# Patient Record
Sex: FEMALE | Race: BLACK OR AFRICAN AMERICAN | Employment: PART TIME | URBAN - METROPOLITAN AREA
[De-identification: names, ages, dates, MRNs, and addresses within clinical notes are randomized per-mention and may not be internally consistent; named-entity substitution may affect disease eponyms.]

---

## 2017-08-06 ENCOUNTER — HOSPITAL ENCOUNTER (EMERGENCY)
Age: 52
Discharge: HOME | End: 2017-08-06
Attending: EMERGENCY MEDICINE
Payer: MEDICAID

## 2017-08-06 VITALS
HEIGHT: 65 IN | TEMPERATURE: 97.8 F | RESPIRATION RATE: 13 BRPM | OXYGEN SATURATION: 100 % | DIASTOLIC BLOOD PRESSURE: 93 MMHG | WEIGHT: 218 LBS | HEART RATE: 59 BPM | SYSTOLIC BLOOD PRESSURE: 181 MMHG | BODY MASS INDEX: 36.32 KG/M2

## 2017-08-06 DIAGNOSIS — R03.0 ELEVATED BLOOD PRESSURE READING: ICD-10-CM

## 2017-08-06 DIAGNOSIS — R60.9 PERIPHERAL EDEMA: Primary | ICD-10-CM

## 2017-08-06 LAB
ANION GAP BLD CALC-SCNC: 5 MMOL/L (ref 3–18)
ATRIAL RATE: 56 BPM
BASOPHILS # BLD AUTO: 0 K/UL (ref 0–0.06)
BASOPHILS # BLD: 0 % (ref 0–2)
BNP SERPL-MCNC: 14 PG/ML (ref 0–450)
BUN SERPL-MCNC: 11 MG/DL (ref 7–18)
BUN/CREAT SERPL: 16 (ref 12–20)
CALCIUM SERPL-MCNC: 9.2 MG/DL (ref 8.5–10.1)
CALCULATED P AXIS, ECG09: 34 DEGREES
CALCULATED R AXIS, ECG10: -32 DEGREES
CALCULATED T AXIS, ECG11: -9 DEGREES
CHLORIDE SERPL-SCNC: 103 MMOL/L (ref 100–108)
CO2 SERPL-SCNC: 33 MMOL/L (ref 21–32)
CREAT SERPL-MCNC: 0.67 MG/DL (ref 0.6–1.3)
DIAGNOSIS, 93000: NORMAL
DIFFERENTIAL METHOD BLD: NORMAL
EOSINOPHIL # BLD: 0.2 K/UL (ref 0–0.4)
EOSINOPHIL NFR BLD: 5 % (ref 0–5)
ERYTHROCYTE [DISTWIDTH] IN BLOOD BY AUTOMATED COUNT: 12.8 % (ref 11.6–14.5)
GLUCOSE SERPL-MCNC: 97 MG/DL (ref 74–99)
HCT VFR BLD AUTO: 37.4 % (ref 35–45)
HGB BLD-MCNC: 12.5 G/DL (ref 12–16)
LYMPHOCYTES # BLD AUTO: 38 % (ref 21–52)
LYMPHOCYTES # BLD: 2 K/UL (ref 0.9–3.6)
MCH RBC QN AUTO: 29.1 PG (ref 24–34)
MCHC RBC AUTO-ENTMCNC: 33.4 G/DL (ref 31–37)
MCV RBC AUTO: 87 FL (ref 74–97)
MONOCYTES # BLD: 0.6 K/UL (ref 0.05–1.2)
MONOCYTES NFR BLD AUTO: 10 % (ref 3–10)
NEUTS SEG # BLD: 2.6 K/UL (ref 1.8–8)
NEUTS SEG NFR BLD AUTO: 47 % (ref 40–73)
P-R INTERVAL, ECG05: 180 MS
PLATELET # BLD AUTO: 167 K/UL (ref 135–420)
PMV BLD AUTO: 10.2 FL (ref 9.2–11.8)
POTASSIUM SERPL-SCNC: 3.6 MMOL/L (ref 3.5–5.5)
Q-T INTERVAL, ECG07: 456 MS
QRS DURATION, ECG06: 146 MS
QTC CALCULATION (BEZET), ECG08: 440 MS
RBC # BLD AUTO: 4.3 M/UL (ref 4.2–5.3)
SODIUM SERPL-SCNC: 141 MMOL/L (ref 136–145)
VENTRICULAR RATE, ECG03: 56 BPM
WBC # BLD AUTO: 5.4 K/UL (ref 4.6–13.2)

## 2017-08-06 PROCEDURE — 93005 ELECTROCARDIOGRAM TRACING: CPT

## 2017-08-06 PROCEDURE — 83880 ASSAY OF NATRIURETIC PEPTIDE: CPT | Performed by: EMERGENCY MEDICINE

## 2017-08-06 PROCEDURE — 85025 COMPLETE CBC W/AUTO DIFF WBC: CPT | Performed by: EMERGENCY MEDICINE

## 2017-08-06 PROCEDURE — 80048 BASIC METABOLIC PNL TOTAL CA: CPT | Performed by: EMERGENCY MEDICINE

## 2017-08-06 PROCEDURE — 99285 EMERGENCY DEPT VISIT HI MDM: CPT

## 2017-08-06 RX ORDER — MULTIVIT WITH IRON,MINERALS
1 TABLET ORAL DAILY
COMMUNITY

## 2017-08-06 NOTE — DISCHARGE INSTRUCTIONS
High Blood Pressure: Care Instructions  Your Care Instructions  If your blood pressure is usually above 140/90, you have high blood pressure, or hypertension. That means the top number is 140 or higher or the bottom number is 90 or higher, or both. Despite what a lot of people think, high blood pressure usually doesn't cause headaches or make you feel dizzy or lightheaded. It usually has no symptoms. But it does increase your risk for heart attack, stroke, and kidney or eye damage. The higher your blood pressure, the more your risk increases. Your doctor will give you a goal for your blood pressure. Your goal will be based on your health and your age. An example of a goal is to keep your blood pressure below 140/90. Lifestyle changes, such as eating healthy and being active, are always important to help lower blood pressure. You might also take medicine to reach your blood pressure goal.  Follow-up care is a key part of your treatment and safety. Be sure to make and go to all appointments, and call your doctor if you are having problems. It's also a good idea to know your test results and keep a list of the medicines you take. How can you care for yourself at home? Medical treatment  · If you stop taking your medicine, your blood pressure will go back up. You may take one or more types of medicine to lower your blood pressure. Be safe with medicines. Take your medicine exactly as prescribed. Call your doctor if you think you are having a problem with your medicine. · Talk to your doctor before you start taking aspirin every day. Aspirin can help certain people lower their risk of a heart attack or stroke. But taking aspirin isn't right for everyone, because it can cause serious bleeding. · See your doctor regularly. You may need to see the doctor more often at first or until your blood pressure comes down.   · If you are taking blood pressure medicine, talk to your doctor before you take decongestants or anti-inflammatory medicine, such as ibuprofen. Some of these medicines can raise blood pressure. · Learn how to check your blood pressure at home. Lifestyle changes  · Stay at a healthy weight. This is especially important if you put on weight around the waist. Losing even 10 pounds can help you lower your blood pressure. · If your doctor recommends it, get more exercise. Walking is a good choice. Bit by bit, increase the amount you walk every day. Try for at least 30 minutes on most days of the week. You also may want to swim, bike, or do other activities. · Avoid or limit alcohol. Talk to your doctor about whether you can drink any alcohol. · Try to limit how much sodium you eat to less than 2,300 milligrams (mg) a day. Your doctor may ask you to try to eat less than 1,500 mg a day. · Eat plenty of fruits (such as bananas and oranges), vegetables, legumes, whole grains, and low-fat dairy products. · Lower the amount of saturated fat in your diet. Saturated fat is found in animal products such as milk, cheese, and meat. Limiting these foods may help you lose weight and also lower your risk for heart disease. · Do not smoke. Smoking increases your risk for heart attack and stroke. If you need help quitting, talk to your doctor about stop-smoking programs and medicines. These can increase your chances of quitting for good. When should you call for help? Call 911 anytime you think you may need emergency care. This may mean having symptoms that suggest that your blood pressure is causing a serious heart or blood vessel problem. Your blood pressure may be over 180/110. For example, call 911 if:  · You have symptoms of a heart attack. These may include:  ¨ Chest pain or pressure, or a strange feeling in the chest.  ¨ Sweating. ¨ Shortness of breath. ¨ Nausea or vomiting. ¨ Pain, pressure, or a strange feeling in the back, neck, jaw, or upper belly or in one or both shoulders or arms.   ¨ Lightheadedness or sudden weakness. ¨ A fast or irregular heartbeat. · You have symptoms of a stroke. These may include:  ¨ Sudden numbness, tingling, weakness, or loss of movement in your face, arm, or leg, especially on only one side of your body. ¨ Sudden vision changes. ¨ Sudden trouble speaking. ¨ Sudden confusion or trouble understanding simple statements. ¨ Sudden problems with walking or balance. ¨ A sudden, severe headache that is different from past headaches. · You have severe back or belly pain. Do not wait until your blood pressure comes down on its own. Get help right away. Call your doctor now or seek immediate care if:  · Your blood pressure is much higher than normal (such as 180/110 or higher), but you don't have symptoms. · You think high blood pressure is causing symptoms, such as:  ¨ Severe headache. ¨ Blurry vision. Watch closely for changes in your health, and be sure to contact your doctor if:  · Your blood pressure measures 140/90 or higher at least 2 times. That means the top number is 140 or higher or the bottom number is 90 or higher, or both. · You think you may be having side effects from your blood pressure medicine. · Your blood pressure is usually normal, but it goes above normal at least 2 times. Where can you learn more? Go to http://crystal-priscila.info/. Enter G230 in the search box to learn more about \"High Blood Pressure: Care Instructions. \"  Current as of: August 8, 2016  Content Version: 11.3  © 4086-1136 [employer]. Care instructions adapted under license by [employer] (which disclaims liability or warranty for this information). If you have questions about a medical condition or this instruction, always ask your healthcare professional. [place] [address] any warranty or liability for your use of this information. Angioedema: Care Instructions  Your Care Instructions  Angioedema is an allergic reaction.  It causes swelling and welts in the deep layers of the skin. Angioedema can sometimes occur along with hives. Hives are an allergic reaction in the outer layers of the skin. Angioedema can range from mild to severe. Painful welts can develop on the face. Angioedema can also occur on other parts of the body. In severe cases, the inside of the throat can swell and make it hard to breathe. Many things can cause this condition, including foods, insect bites, and medicines (such as aspirin and some blood pressure medicines). It also can run in families. Sometimes you may know what caused the reaction, but other times you may not know. Follow-up care is a key part of your treatment and safety. Be sure to make and go to all appointments, and call your doctor if you are having problems. It's also a good idea to know your test results and keep a list of the medicines you take. How can you care for yourself at home? · Take your medicines exactly as prescribed. Call your doctor if you think you are having a problem with your medicine. You will get more details on the specific medicines your doctor prescribes. Some medicines used to treat angioedema can make you too sleepy to drive safely. Do not drive if you take medicine that may make you sleepy. · Avoid foods or medicine that may have triggered the swelling. · For comfort:  ¨ Try taking a cool bath. Or place a cool, wet towel on the swollen area. ¨ Avoid hot baths and showers. ¨ Wear loose clothing. · Your doctor may prescribe a shot of epinephrine to carry with you in case you have a severe reaction. Learn how to give yourself the shot and keep it with you at all times. Make sure it has not . When should you call for help? Give an epinephrine shot if:  · You think you are having a severe allergic reaction. After giving an epinephrine shot call 911, even if you feel better. Call 911 if:  · You have symptoms of a severe allergic reaction.  These may include:  ¨ Sudden raised, red areas (hives) all over your body. ¨ Swelling of the throat, mouth, lips, or tongue. ¨ Trouble breathing. ¨ Passing out (losing consciousness). Or you may feel very lightheaded or suddenly feel weak, confused, or restless. · You have been given an epinephrine shot, even if you feel better. Call your doctor now or seek immediate medical care if:  · You have symptoms of an allergic reaction, such as:  ¨ A rash or hives (raised, red areas on the skin). ¨ Itching. ¨ Swelling. ¨ Belly pain, nausea, or vomiting. Watch closely for changes in your health, and be sure to contact your doctor if:  · You do not get better as expected. Where can you learn more? Go to http://crystal-priscila.info/. Enter Q122 in the search box to learn more about \"Angioedema: Care Instructions. \"  Current as of: April 3, 2017  Content Version: 11.3  © 2403-2732 [employer]. Care instructions adapted under license by [employer] (which disclaims liability or warranty for this information). If you have questions about a medical condition or this instruction, always ask your healthcare professional. [place] [address] any warranty or liability for your use of this information.

## 2017-08-06 NOTE — LETTER
[facility] EMERGENCY DEPT 
[facility] [address] [id number] 
[phone] Work Note Date: 8/6/2017 To Whom It May concern: 
 
Rodrick Hernandez was seen and treated today in the emergency room by the following provider(s): 
Attending Provider: DO. Alisson Brunner {Return to work:8/7/2017} Sincerely, Kulwinder Perez MD

## 2017-08-06 NOTE — ED PROVIDER NOTES
HPI Comments: 9:14 AM  The patient is a 54 y.o. female who presents with 10/10 feet pain and swelling. Pt reports that she started having sharp foot pain in her left heel, radiating up her leg a month ago before starting to note the same set of sx in her right foot a week ago. Pt states that she has tried icy hot at night with minor symptom relief in the morning. She denies current fever, chills, N/V, SOB, and CP. She has no past medical history or allergies. Pt admits that in past 1 month has been walking more than usual. Also stands for prolonged periods at work. Reports using maría hose. No hx of DVT or PE. No recent travel, no leg swelling. The history is provided by the patient. No  was used. History reviewed. No pertinent past medical history. Past Surgical History:   Procedure Laterality Date    HX  SECTION           History reviewed. No pertinent family history. Social History     Social History    Marital status: LEGALLY      Spouse name: N/A    Number of children: N/A    Years of education: N/A     Occupational History    Not on file. Social History Main Topics    Smoking status: Never Smoker    Smokeless tobacco: Never Used    Alcohol use No    Drug use: Not on file    Sexual activity: Not on file     Other Topics Concern    Not on file     Social History Narrative         ALLERGIES: Review of patient's allergies indicates no known allergies. Review of Systems   Constitutional: Negative. Negative for chills and fever. HENT: Negative. Eyes: Negative. Respiratory: Negative. Negative for shortness of breath. Cardiovascular: Negative. Negative for chest pain. Gastrointestinal: Negative. Negative for nausea and vomiting. Endocrine: Negative. Genitourinary: Negative. Musculoskeletal:        Bilateral foot pain and swelling   Skin: Negative. Allergic/Immunologic: Negative. Neurological: Negative. Hematological: Negative. Psychiatric/Behavioral: Negative. All other systems reviewed and are negative. Vitals:    08/06/17 1014 08/06/17 1015 08/06/17 1021 08/06/17 1030   BP:  155/88  174/89   Pulse:       Resp:       Temp:       SpO2: 98% 98% 100% 98%   Weight:       Height:                Physical Exam   Constitutional: She is oriented to person, place, and time. She appears well-developed and well-nourished. HENT:   Head: Normocephalic and atraumatic. Neck: Neck supple. No JVD present. Cardiovascular:   S3   Pulmonary/Chest: Effort normal and breath sounds normal. No respiratory distress. Abdominal: Soft. She exhibits no distension. There is no tenderness. There is no rebound and no guarding. Musculoskeletal: She exhibits edema. LE: 1+ edema   nontender to palpation   DP 2+ BL  Cap refill 1 sec, toes warm  Negative Homans   Neurological: She is alert and oriented to person, place, and time. Skin: Skin is warm and dry. No erythema. Psychiatric: Judgment normal.        MDM  Number of Diagnoses or Management Options  Elevated blood pressure reading:   Peripheral edema:   Diagnosis management comments: 53 y/o female presents with edema    Symmetric, not consistent with DVT, no DVT risk factors    Screen for chf, renal function.         Amount and/or Complexity of Data Reviewed  Clinical lab tests: ordered and reviewed  Tests in the medicine section of CPT®: reviewed and ordered      ED Course       Procedures    Vitals:  Patient Vitals for the past 12 hrs:   Temp Pulse Resp BP SpO2   08/06/17 1030 - - - 174/89 98 %   08/06/17 1021 - - - - 100 %   08/06/17 1015 - - - 155/88 98 %   08/06/17 1014 - - - - 98 %   08/06/17 1012 - - - (!) 164/98 -   08/06/17 0732 97.8 °F (36.6 °C) (!) 59 13 (!) 192/124 100 %       Medications Ordered:  Medications - No data to display    Lab Findings:  Recent Results (from the past 12 hour(s))   EKG, 12 LEAD, INITIAL    Collection Time: 08/06/17  7:31 AM Result Value Ref Range    Ventricular Rate 56 BPM    Atrial Rate 56 BPM    P-R Interval 180 ms    QRS Duration 146 ms    Q-T Interval 456 ms    QTC Calculation (Bezet) 440 ms    Calculated P Axis 34 degrees    Calculated R Axis -32 degrees    Calculated T Axis -9 degrees    Diagnosis       Sinus bradycardia  Left axis deviation  Right bundle branch block  Voltage criteria for left ventricular hypertrophy  Abnormal ECG  When compared with ECG of 06-JUL-2016 15:49,  T wave inversion no longer evident in Anterior leads     CBC WITH AUTOMATED DIFF    Collection Time: 08/06/17 10:15 AM   Result Value Ref Range    WBC 5.4 4.6 - 13.2 K/uL    RBC 4.30 4.20 - 5.30 M/uL    HGB 12.5 12.0 - 16.0 g/dL    HCT 37.4 35.0 - 45.0 %    MCV 87.0 74.0 - 97.0 FL    MCH 29.1 24.0 - 34.0 PG    MCHC 33.4 31.0 - 37.0 g/dL    RDW 12.8 11.6 - 14.5 %    PLATELET 198 332 - 221 K/uL    MPV 10.2 9.2 - 11.8 FL    NEUTROPHILS 47 40 - 73 %    LYMPHOCYTES 38 21 - 52 %    MONOCYTES 10 3 - 10 %    EOSINOPHILS 5 0 - 5 %    BASOPHILS 0 0 - 2 %    ABS. NEUTROPHILS 2.6 1.8 - 8.0 K/UL    ABS. LYMPHOCYTES 2.0 0.9 - 3.6 K/UL    ABS. MONOCYTES 0.6 0.05 - 1.2 K/UL    ABS. EOSINOPHILS 0.2 0.0 - 0.4 K/UL    ABS.  BASOPHILS 0.0 0.0 - 0.06 K/UL    DF AUTOMATED     METABOLIC PANEL, BASIC    Collection Time: 08/06/17 10:15 AM   Result Value Ref Range    Sodium 141 136 - 145 mmol/L    Potassium 3.6 3.5 - 5.5 mmol/L    Chloride 103 100 - 108 mmol/L    CO2 33 (H) 21 - 32 mmol/L    Anion gap 5 3.0 - 18 mmol/L    Glucose 97 74 - 99 mg/dL    BUN 11 7.0 - 18 MG/DL    Creatinine 0.67 0.6 - 1.3 MG/DL    BUN/Creatinine ratio 16 12 - 20      GFR est AA >60 >60 ml/min/1.73m2    GFR est non-AA >60 >60 ml/min/1.73m2    Calcium 9.2 8.5 - 10.1 MG/DL   NT-PRO BNP    Collection Time: 08/06/17 10:15 AM   Result Value Ref Range    NT pro-BNP 14 0 - 450 PG/ML       EKG Interpretation by ED physician:  Rhythm: 56  Rate: SB bpm  Interpretation: Left Axis, Right BBB, No ST changes  EKG interpret by Yany Hameed DO 7:31 AM    Discussed results with pt, stable for dc home. Discussed return precautions. Diagnosis:   1. Peripheral edema    2. Elevated blood pressure reading        Disposition: Discharged    Follow-up Information     Follow up With Details Comments Contact Info    Prabhakar Goodson MD Schedule an appointment as soon as possible for a visit in 2 days  [phone] [address] [phone]      [facility] EMERGENCY DEPT  As needed, If symptoms worsen [address]  [phone]           Patient's Medications   Start Taking    No medications on file   Continue Taking    ASPIRIN DELAYED-RELEASE 81 MG TABLET    Take 81 mg by mouth daily. COD LIVER OIL CAP    Take 1 Cap by mouth daily. MULTIVITAMIN (ONE A DAY) TABLET    Take 1 Tab by mouth daily. These Medications have changed    No medications on file   Stop Taking    ALBUTEROL (PROVENTIL HFA, VENTOLIN HFA, PROAIR HFA) 90 MCG/ACTUATION INHALER    Take 1-2 Puffs by inhalation every four (4) hours as needed for Wheezing (coughing spells). Scribe Attestation      Melanie Toscano acting as a scribe for and in the presence of Yany Hameed DO      August 06, 2017        Provider Attestation:      I personally performed the services described in the documentation, reviewed the documentation, as recorded by the scribe in my presence, and it accurately and completely records my words and actions.  August 06, 2017  Luke Dobson DO

## 2017-11-30 ENCOUNTER — APPOINTMENT (OUTPATIENT)
Dept: GENERAL RADIOLOGY | Age: 52
End: 2017-11-30
Attending: EMERGENCY MEDICINE
Payer: MEDICAID

## 2017-11-30 ENCOUNTER — APPOINTMENT (OUTPATIENT)
Dept: CT IMAGING | Age: 52
End: 2017-11-30
Attending: EMERGENCY MEDICINE
Payer: MEDICAID

## 2017-11-30 ENCOUNTER — HOSPITAL ENCOUNTER (EMERGENCY)
Age: 52
Discharge: HOME | End: 2017-11-30
Attending: EMERGENCY MEDICINE
Payer: MEDICAID

## 2017-11-30 VITALS
BODY MASS INDEX: 36.61 KG/M2 | RESPIRATION RATE: 10 BRPM | HEART RATE: 60 BPM | WEIGHT: 220 LBS | OXYGEN SATURATION: 97 % | SYSTOLIC BLOOD PRESSURE: 148 MMHG | DIASTOLIC BLOOD PRESSURE: 98 MMHG | TEMPERATURE: 98 F

## 2017-11-30 LAB
ALBUMIN SERPL-MCNC: 3.8 G/DL (ref 3.4–5)
ALBUMIN/GLOB SERPL: 0.9 {RATIO} (ref 0.8–1.7)
ALP SERPL-CCNC: 74 U/L (ref 45–117)
ALT SERPL-CCNC: 25 U/L (ref 13–56)
AMPHET UR QL SCN: NEGATIVE
ANION GAP SERPL CALC-SCNC: 7 MMOL/L (ref 3–18)
APTT PPP: 27.5 SEC (ref 23–36.4)
AST SERPL-CCNC: 16 U/L (ref 15–37)
BARBITURATES UR QL SCN: NEGATIVE
BASOPHILS # BLD: 0 K/UL (ref 0–0.1)
BASOPHILS NFR BLD: 0 % (ref 0–2)
BENZODIAZ UR QL: NEGATIVE
BILIRUB SERPL-MCNC: 0.3 MG/DL (ref 0.2–1)
BUN SERPL-MCNC: 10 MG/DL (ref 7–18)
BUN/CREAT SERPL: 15 (ref 12–20)
CALCIUM SERPL-MCNC: 8.8 MG/DL (ref 8.5–10.1)
CANNABINOIDS UR QL SCN: NEGATIVE
CHLORIDE SERPL-SCNC: 102 MMOL/L (ref 100–108)
CO2 SERPL-SCNC: 27 MMOL/L (ref 21–32)
COCAINE UR QL SCN: NEGATIVE
CREAT SERPL-MCNC: 0.66 MG/DL (ref 0.6–1.3)
DIFFERENTIAL METHOD BLD: NORMAL
EOSINOPHIL # BLD: 0.3 K/UL (ref 0–0.4)
EOSINOPHIL NFR BLD: 5 % (ref 0–5)
ERYTHROCYTE [DISTWIDTH] IN BLOOD BY AUTOMATED COUNT: 13.2 % (ref 11.6–14.5)
ETHANOL SERPL-MCNC: <3 MG/DL (ref 0–3)
GLOBULIN SER CALC-MCNC: 4.2 G/DL (ref 2–4)
GLUCOSE SERPL-MCNC: 101 MG/DL (ref 74–99)
HCT VFR BLD AUTO: 38 % (ref 35–45)
HDSCOM,HDSCOM: NORMAL
HGB BLD-MCNC: 12.6 G/DL (ref 12–16)
INR PPP: 1 (ref 0.8–1.2)
LYMPHOCYTES # BLD: 2.9 K/UL (ref 0.9–3.6)
LYMPHOCYTES NFR BLD: 45 % (ref 21–52)
MCH RBC QN AUTO: 29.3 PG (ref 24–34)
MCHC RBC AUTO-ENTMCNC: 33.2 G/DL (ref 31–37)
MCV RBC AUTO: 88.4 FL (ref 74–97)
METHADONE UR QL: NEGATIVE
MONOCYTES # BLD: 0.4 K/UL (ref 0.05–1.2)
MONOCYTES NFR BLD: 6 % (ref 3–10)
NEUTS SEG # BLD: 2.9 K/UL (ref 1.8–8)
NEUTS SEG NFR BLD: 44 % (ref 40–73)
OPIATES UR QL: NEGATIVE
PCP UR QL: NEGATIVE
PLATELET # BLD AUTO: 222 K/UL (ref 135–420)
PMV BLD AUTO: 10.7 FL (ref 9.2–11.8)
POTASSIUM SERPL-SCNC: 3.5 MMOL/L (ref 3.5–5.5)
PROT SERPL-MCNC: 8 G/DL (ref 6.4–8.2)
PROTHROMBIN TIME: 12.7 SEC (ref 11.5–15.2)
RBC # BLD AUTO: 4.3 M/UL (ref 4.2–5.3)
SODIUM SERPL-SCNC: 136 MMOL/L (ref 136–145)
TROPONIN I SERPL-MCNC: <0.02 NG/ML (ref 0–0.04)
TSH SERPL DL<=0.05 MIU/L-ACNC: 2.93 UIU/ML (ref 0.36–3.74)
WBC # BLD AUTO: 6.5 K/UL (ref 4.6–13.2)

## 2017-11-30 PROCEDURE — 85025 COMPLETE CBC W/AUTO DIFF WBC: CPT | Performed by: EMERGENCY MEDICINE

## 2017-11-30 PROCEDURE — 80307 DRUG TEST PRSMV CHEM ANLYZR: CPT | Performed by: EMERGENCY MEDICINE

## 2017-11-30 PROCEDURE — 93005 ELECTROCARDIOGRAM TRACING: CPT

## 2017-11-30 PROCEDURE — 84443 ASSAY THYROID STIM HORMONE: CPT | Performed by: EMERGENCY MEDICINE

## 2017-11-30 PROCEDURE — 84484 ASSAY OF TROPONIN QUANT: CPT | Performed by: EMERGENCY MEDICINE

## 2017-11-30 PROCEDURE — 71010 XR CHEST PORT: CPT

## 2017-11-30 PROCEDURE — 85730 THROMBOPLASTIN TIME PARTIAL: CPT | Performed by: EMERGENCY MEDICINE

## 2017-11-30 PROCEDURE — 85610 PROTHROMBIN TIME: CPT | Performed by: EMERGENCY MEDICINE

## 2017-11-30 PROCEDURE — 99285 EMERGENCY DEPT VISIT HI MDM: CPT

## 2017-11-30 PROCEDURE — 80053 COMPREHEN METABOLIC PANEL: CPT | Performed by: EMERGENCY MEDICINE

## 2017-11-30 PROCEDURE — 70450 CT HEAD/BRAIN W/O DYE: CPT

## 2017-11-30 NOTE — DISCHARGE INSTRUCTIONS
Numbness and Tingling: Care Instructions  Your Care Instructions    Many things can cause numbness or tingling. Swelling may put pressure on a nerve. This could cause you to lose feeling or have a pins-and-needles sensation on part of your body. Nerves may be damaged from trauma, toxins, or diseases, such as diabetes or multiple sclerosis (MS). Sometimes, though, the cause is not clear. If there is no clear reason for your symptoms, and you are not having any other symptoms, your doctor may suggest watching and waiting for a while to see if the numbness or tingling goes away on its own. Your doctor may want you to have blood or nerve tests to find the cause of your symptoms. Follow-up care is a key part of your treatment and safety. Be sure to make and go to all appointments, and call your doctor if you are having problems. It's also a good idea to know your test results and keep a list of the medicines you take. How can you care for yourself at home? · If your doctor prescribes medicine, take it exactly as directed. Call your doctor if you think you are having a problem with your medicine. · If you have any swelling, put ice or a cold pack on the area for 10 to 20 minutes at a time. Put a thin cloth between the ice and your skin. When should you call for help? Call 911 anytime you think you may need emergency care. For example, call if:  ? · You have weakness, numbness, or tingling in both legs. ? · You lose bowel or bladder control. ? · You have symptoms of a stroke. These may include:  ¨ Sudden numbness, tingling, weakness, or loss of movement in your face, arm, or leg, especially on only one side of your body. ¨ Sudden vision changes. ¨ Sudden trouble speaking. ¨ Sudden confusion or trouble understanding simple statements. ¨ Sudden problems with walking or balance. ¨ A sudden, severe headache that is different from past headaches. ? Watch closely for changes in your health, and be sure to contact your doctor if you have any problems, or if:  ? · You do not get better as expected. Where can you learn more? Go to http://crystal-priscila.info/. Enter M864 in the search box to learn more about \"Numbness and Tingling: Care Instructions. \"  Current as of: October 14, 2016  Content Version: 11.4  © 1822-7359 [employer]. Care instructions adapted under license by [employer] (which disclaims liability or warranty for this information). If you have questions about a medical condition or this instruction, always ask your healthcare professional. [place] [address] any warranty or liability for your use of this information.

## 2017-11-30 NOTE — ED NOTES
I have reviewed discharge instructions with the patient. The patient verbalized understanding. Patient A/O x 4, denies any new complaints at this time. Ambulatory, steady gait noted. No acute distress noted. Left ED with family member.

## 2017-11-30 NOTE — ED TRIAGE NOTES
Patient A/O x 4, was Code green from CVT stepdown. As per nurses, patient began complaining of chest tightness then upon looking at her, they noticed her face became droopy with slurred speech. Patient denies extremities numbness and weakness. No slurred speech noted, slight droop noted. Denies SOB, N/V, abdominal pain.

## 2017-11-30 NOTE — ED NOTES
Patient sleeping comfortably on stretcher, denies any new complaints at this time. Patronis monitor at patient's bedside.  Awaiting evaluation from neurologist.

## 2017-11-30 NOTE — ED NOTES
Patient ambulated to bathroom after returning from Ct scan, steady gait noted. Patient's family member at bedside. Awaiting urine sample.

## 2017-11-30 NOTE — ED PROVIDER NOTES
EMERGENCY DEPARTMENT HISTORY AND PHYSICAL EXAM    12:32 AM      Date: 2017  Patient Name: Gretta Ortega    History of Presenting Illness     Chief Complaint   Patient presents with    Chest Pain     tightness         History Provided By: Patient    Chief Complaint: Chest tightness  Duration: 3  Hours  Timing:  Constant  Location: sternal  Quality: Tightness  Severity: Moderate      Additional History (Context): Gretta Ortega is a 50 y.o. Female, nonsmoker, nondrinker, with no PMHx who presents to the ED with constant, moderate, sternal chest tightness x 3 hours. Pt denies difficulty speaking, weakness and other stroke related sx. She also denies hx of MI, CHF, DM, HTN, hyperlipidemia, and family hx of MI. No other complaints or concerns are reported at this time. PCP: Evelyne Flores MD    Current Outpatient Prescriptions   Medication Sig Dispense Refill    cod liver oil cap Take 1 Cap by mouth daily.  aspirin delayed-release 81 mg tablet Take 81 mg by mouth daily.  multivitamin (ONE A DAY) tablet Take 1 Tab by mouth daily. Past History     Past Medical History:  History reviewed. No pertinent past medical history. Past Surgical History:  Past Surgical History:   Procedure Laterality Date    HX  SECTION         Family History:  History reviewed. No pertinent family history. Social History:  Social History   Substance Use Topics    Smoking status: Never Smoker    Smokeless tobacco: Never Used    Alcohol use No       Allergies:  No Known Allergies      Review of Systems       Review of Systems   Respiratory: Positive for chest tightness. Neurological: Negative for weakness. No difficulty speaking    All other systems reviewed and are negative.         Physical Exam     Visit Vitals    /81 (BP 1 Location: Right arm, BP Patient Position: At rest)    Pulse 64    Temp 97.9 °F (36.6 °C)    Resp 15    Wt 99.8 kg (220 lb)    LMP 2017    SpO2 100%    BMI 36.61 kg/m2         Physical Exam  Physical Exam:  . Patient Vitals for the past 12 hrs:   Temp Pulse Resp BP SpO2   11/30/17 0028 97.9 °F (36.6 °C) 64 15 146/81 100 %     Gen: Well developed, well nourished 50 y.o. female  HEENT: Normocephalic, atraumatic  Respiratory: No accessory muscle use No wheeze, No rales, No rhonchi. Normal chest wall excursion. No subcutaneous air, no rib crepitus  Cardiovascular: Regular rhythm and rate, Normal pulses, Normal perfusion. No edema  Gastrointestinal: Non distended, Non tender, No masses. No ascites. No organomegaly. No evidence of trauma  Musculoskeletal: Full range of motion at all other tested joints. No joint effusions. Neuro: Normal strength, Normal sensation. Normal speech. No ataxia. Cranial Nerves II-XII normal as tested. NIH stroke scale of 0. Skin: No rash, petechia or purpura. Warm and dry  Psyche: No suicidal ideation, No homicidal ideation. No hallucinations. Organized thoughts. Heme: Normal  : Deferred        Diagnostic Study Results     Labs -  Recent Results (from the past 12 hour(s))   CBC WITH AUTOMATED DIFF    Collection Time: 11/30/17 12:40 AM   Result Value Ref Range    WBC 6.5 4.6 - 13.2 K/uL    RBC 4.30 4.20 - 5.30 M/uL    HGB 12.6 12.0 - 16.0 g/dL    HCT 38.0 35.0 - 45.0 %    MCV 88.4 74.0 - 97.0 FL    MCH 29.3 24.0 - 34.0 PG    MCHC 33.2 31.0 - 37.0 g/dL    RDW 13.2 11.6 - 14.5 %    PLATELET 107 572 - 845 K/uL    MPV 10.7 9.2 - 11.8 FL    NEUTROPHILS 44 40 - 73 %    LYMPHOCYTES 45 21 - 52 %    MONOCYTES 6 3 - 10 %    EOSINOPHILS 5 0 - 5 %    BASOPHILS 0 0 - 2 %    ABS. NEUTROPHILS 2.9 1.8 - 8.0 K/UL    ABS. LYMPHOCYTES 2.9 0.9 - 3.6 K/UL    ABS. MONOCYTES 0.4 0.05 - 1.2 K/UL    ABS. EOSINOPHILS 0.3 0.0 - 0.4 K/UL    ABS.  BASOPHILS 0.0 0.0 - 0.1 K/UL    DF AUTOMATED     METABOLIC PANEL, COMPREHENSIVE    Collection Time: 11/30/17 12:40 AM   Result Value Ref Range    Sodium 136 136 - 145 mmol/L    Potassium 3.5 3.5 - 5.5 mmol/L Chloride 102 100 - 108 mmol/L    CO2 27 21 - 32 mmol/L    Anion gap 7 3.0 - 18 mmol/L    Glucose 101 (H) 74 - 99 mg/dL    BUN 10 7.0 - 18 MG/DL    Creatinine 0.66 0.6 - 1.3 MG/DL    BUN/Creatinine ratio 15 12 - 20      GFR est AA >60 >60 ml/min/1.73m2    GFR est non-AA >60 >60 ml/min/1.73m2    Calcium 8.8 8.5 - 10.1 MG/DL    Bilirubin, total 0.3 0.2 - 1.0 MG/DL    ALT (SGPT) 25 13 - 56 U/L    AST (SGOT) 16 15 - 37 U/L    Alk.  phosphatase 74 45 - 117 U/L    Protein, total 8.0 6.4 - 8.2 g/dL    Albumin 3.8 3.4 - 5.0 g/dL    Globulin 4.2 (H) 2.0 - 4.0 g/dL    A-G Ratio 0.9 0.8 - 1.7     TROPONIN I    Collection Time: 11/30/17 12:40 AM   Result Value Ref Range    Troponin-I, Qt. <0.02 0.0 - 0.045 NG/ML   ETHYL ALCOHOL    Collection Time: 11/30/17 12:40 AM   Result Value Ref Range    ALCOHOL(ETHYL),SERUM <3 0 - 3 MG/DL   TSH 3RD GENERATION    Collection Time: 11/30/17 12:40 AM   Result Value Ref Range    TSH 2.93 0.36 - 3.74 uIU/mL   PROTHROMBIN TIME + INR    Collection Time: 11/30/17 12:40 AM   Result Value Ref Range    Prothrombin time 12.7 11.5 - 15.2 sec    INR 1.0 0.8 - 1.2     PTT    Collection Time: 11/30/17 12:40 AM   Result Value Ref Range    aPTT 27.5 23.0 - 36.4 SEC   EKG, 12 LEAD, INITIAL    Collection Time: 11/30/17 12:54 AM   Result Value Ref Range    Ventricular Rate 64 BPM    Atrial Rate 64 BPM    P-R Interval 218 ms    QRS Duration 150 ms    Q-T Interval 466 ms    QTC Calculation (Bezet) 480 ms    Calculated P Axis 43 degrees    Calculated R Axis -41 degrees    Calculated T Axis -8 degrees    Diagnosis       Sinus rhythm with 1st degree AV block  Left axis deviation  Right bundle branch block  Voltage criteria for left ventricular hypertrophy  Abnormal ECG  When compared with ECG of 06-AUG-2017 07:31,  SC interval has increased  Nonspecific T wave abnormality, improved in Anterolateral leads     DRUG SCREEN, URINE    Collection Time: 11/30/17  1:24 AM   Result Value Ref Range    BENZODIAZEPINES NEGATIVE NEG      BARBITURATES NEGATIVE  NEG      THC (TH-CANNABINOL) NEGATIVE  NEG      OPIATES NEGATIVE  NEG      PCP(PHENCYCLIDINE) NEGATIVE  NEG      COCAINE NEGATIVE  NEG      AMPHETAMINES NEGATIVE  NEG      METHADONE NEGATIVE  NEG      HDSCOM (NOTE)        Radiologic Studies -   CT HEAD WO CONT   Final Result      XR CHEST PORT    (Results Pending)     XR CHEST PORT  12:56 AM  Impression by Dr. Jag Moise  No pna, no pneumothorax, poorly penetrated film    CTV - HEAD without IV CONTRAST  Preliminary read   Impression by radiologist: Boubacar Rivera   1. No clearly acute intracranial findings. Of note, MRI is more sensitive in the detection of acute infarct. 2. Mild periventricular white matter low-attenuation, likely chronic microvascular ischemic change. Possible age-indeterminate small lacunar infarct in the right caudate head. Medical Decision Making   I am the first provider for this patient. I reviewed the vital signs, available nursing notes, past medical history, past surgical history, family history and social history. Vital Signs-Reviewed the patient's vital signs. Pulse Oximetry Analysis -  100% on room air (Interpretation) Normal    Cardiac Monitor:  Rate: 68  Rhythm:  NSR    EKG: Interpreted by the EP. Time Interpreted: 00:54   Rate: 64   Rhythm: Normal Sinus Rhythm    Interpretation: QRS of 150 ms, RBBB, No STEMI     Records Reviewed: Nursing Notes and Old Medical Records (Time of Review: 12:32 AM)    ED Course: Progress Notes, Reevaluation, and Consults:  3:26 AM Consult: I discussed care with teleneurologist.  It was a standard discussion including patient history, chief complaint, available diagnostic results, and predicted treatment course. Will evaluated the pt. Provider Notes (Medical Decision Making): Pt was evaluated by telleneurology,  telleneurology stated there is no evidence of acute CVA and that sx are likely related to anxiety and a panick attack. CT findings were noted. Pt is low risk for outpatient follow up. Asked pt to stay for a second set of cardiac enzymes, but pt refused. Advised her of the risks, but she decided to go home anyway. Diagnosis     Clinical Impression:   1. Paresthesias/numbness        Disposition: Discharged    Follow-up Information     Follow up With Details Comments [address] Khalida Tidwell MD Call in 1 day  [address] [address] [phone]             Patient's Medications   Start Taking    No medications on file   Continue Taking    ASPIRIN DELAYED-RELEASE 81 MG TABLET    Take 81 mg by mouth daily. COD LIVER OIL CAP    Take 1 Cap by mouth daily. MULTIVITAMIN (ONE A DAY) TABLET    Take 1 Tab by mouth daily. These Medications have changed    No medications on file   Stop Taking    No medications on file     _______________________________    Attestations:  Lubna Ramirez acting as a scribe for and in the presence of Hemanth Boles MD      November 30, 2017 at 12:32 AM       Provider Attestation:      I personally performed the services described in the documentation, reviewed the documentation, as recorded by the scribe in my presence, and it accurately and completely records my words and actions.  November 30, 2017 at 12:32 AM - Laura Alves MD    _______________________________

## 2017-12-01 LAB
ATRIAL RATE: 64 BPM
CALCULATED P AXIS, ECG09: 43 DEGREES
CALCULATED R AXIS, ECG10: -41 DEGREES
CALCULATED T AXIS, ECG11: -8 DEGREES
DIAGNOSIS, 93000: NORMAL
P-R INTERVAL, ECG05: 218 MS
Q-T INTERVAL, ECG07: 466 MS
QRS DURATION, ECG06: 150 MS
QTC CALCULATION (BEZET), ECG08: 480 MS
VENTRICULAR RATE, ECG03: 64 BPM

## 2018-12-29 ENCOUNTER — HOSPITAL ENCOUNTER (EMERGENCY)
Age: 53
Discharge: HOME | End: 2018-12-29
Attending: EMERGENCY MEDICINE | Admitting: EMERGENCY MEDICINE
Payer: MEDICAID

## 2018-12-29 ENCOUNTER — APPOINTMENT (OUTPATIENT)
Dept: GENERAL RADIOLOGY | Age: 53
End: 2018-12-29
Attending: EMERGENCY MEDICINE
Payer: MEDICAID

## 2018-12-29 VITALS
RESPIRATION RATE: 16 BRPM | WEIGHT: 210 LBS | TEMPERATURE: 97.9 F | HEART RATE: 60 BPM | BODY MASS INDEX: 34.99 KG/M2 | HEIGHT: 65 IN | OXYGEN SATURATION: 99 % | SYSTOLIC BLOOD PRESSURE: 140 MMHG | DIASTOLIC BLOOD PRESSURE: 86 MMHG

## 2018-12-29 DIAGNOSIS — M54.6 CHRONIC RIGHT-SIDED THORACIC BACK PAIN: Primary | ICD-10-CM

## 2018-12-29 DIAGNOSIS — G89.29 CHRONIC RIGHT-SIDED THORACIC BACK PAIN: Primary | ICD-10-CM

## 2018-12-29 DIAGNOSIS — T14.8XXA MUSCLE STRAIN: ICD-10-CM

## 2018-12-29 PROCEDURE — 74011250637 HC RX REV CODE- 250/637: Performed by: EMERGENCY MEDICINE

## 2018-12-29 PROCEDURE — 99282 EMERGENCY DEPT VISIT SF MDM: CPT

## 2018-12-29 PROCEDURE — 73030 X-RAY EXAM OF SHOULDER: CPT

## 2018-12-29 RX ORDER — ACETAMINOPHEN 500 MG
1000 TABLET ORAL
Status: DISCONTINUED | OUTPATIENT
Start: 2018-12-29 | End: 2018-12-29 | Stop reason: HOSPADM

## 2018-12-29 RX ORDER — IBUPROFEN 400 MG/1
400 TABLET ORAL
Status: COMPLETED | OUTPATIENT
Start: 2018-12-29 | End: 2018-12-29

## 2018-12-29 RX ORDER — CYCLOBENZAPRINE HCL 5 MG
10 TABLET ORAL 3 TIMES DAILY
Qty: 9 TAB | Refills: 0 | Status: SHIPPED | OUTPATIENT
Start: 2018-12-29

## 2018-12-29 RX ADMIN — IBUPROFEN 400 MG: 400 TABLET ORAL at 06:46

## 2018-12-29 NOTE — ED PROVIDER NOTES
EMERGENCY DEPARTMENT HISTORY AND PHYSICAL EXAM 
 
6:25 AM 
 
 
Date: 2018 Patient Name: Yasmany Paredes History of Presenting Illness Chief Complaint Patient presents with  Shoulder Pain History Provided By: Patient Chief Complaint: Shoulder pain Duration:  Days Timing:  Intermittent and acute on chronic Location: R shoulder Quality: Sharp and spasm Severity: 7 out of 10 Modifying Factors: worsened by coughing, movement, and weather changes Associated Symptoms: denies any other associated signs or symptoms Additional History (Context): Yasmany Paredes is a 48 y.o. female with No significant past medical history who presents with acute on chronic, severe R shoulder pain for 1 day. Pt states it feels sharp, like a spasm, and is worsened by coughing and movement. Pt is right handed, notes hx overuse and injury to R shoulder which worsens with weather changes. She states she ran out of muscle relaxers and Advil is of minor improvement. 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 No hx cholecystectomy. PCP: None Current Facility-Administered Medications Medication Dose Route Frequency Provider Last Rate Last Dose  acetaminophen (TYLENOL) tablet 1,000 mg  1,000 mg Oral NOW Shirley Dela Cruz,       
 
Current Outpatient Medications Medication Sig Dispense Refill  cyclobenzaprine (FLEXERIL) 5 mg tablet Take 2 Tabs by mouth three (3) times daily. 9 Tab 0  
 cod liver oil cap Take 1 Cap by mouth daily.  aspirin delayed-release 81 mg tablet Take 81 mg by mouth daily.  multivitamin (ONE A DAY) tablet Take 1 Tab by mouth daily. Past History Past Medical History: No past medical history on file. Past Surgical History: 
Past Surgical History:  
Procedure Laterality Date  HX  SECTION Family History: No family history on file. Social History: 
Social History Tobacco Use  
  Smoking status: Never Smoker  Smokeless tobacco: Never Used Substance Use Topics  Alcohol use: No  
 Drug use: Not on file Allergies: 
No Known Allergies Review of Systems Review of Systems Constitutional: Negative for chills and fever. HENT: Negative for ear pain and sore throat. Eyes: Negative for pain and visual disturbance. Respiratory: Negative for cough and shortness of breath. Cardiovascular: Negative for chest pain and palpitations. Gastrointestinal: Negative for abdominal pain, diarrhea, nausea and vomiting. Genitourinary: Negative for flank pain. Musculoskeletal: Positive for arthralgias (R shoulder) and myalgias (R shoulder). Negative for back pain and neck pain. Neurological: Negative for syncope and headaches. Psychiatric/Behavioral: Negative for agitation. The patient is not nervous/anxious. Physical Exam  
 
Visit Vitals /86 (BP 1 Location: Left arm, BP Patient Position: At rest) Pulse 60 Temp 97.9 °F (36.6 °C) Resp 16 Ht 5' 5\" (1.651 m) Wt 95.3 kg (210 lb) SpO2 99% BMI 34.95 kg/m² Physical Exam  
Constitutional: She is oriented to person, place, and time. She appears well-developed and well-nourished. HENT:  
Head: Normocephalic and atraumatic. Mouth/Throat: Oropharynx is clear and moist.  
Eyes: Pupils are equal, round, and reactive to light. No scleral icterus. Neck: Neck supple. No tracheal deviation present. Cardiovascular: Regular rhythm. No murmur heard. Pulses: 
     Radial pulses are 2+ on the right side, and 2+ on the left side. Pulmonary/Chest: Effort normal and breath sounds normal. No respiratory distress. Abdominal: Soft. There is no tenderness. Musculoskeletal: Normal range of motion. She exhibits no deformity. R trap ttp R thoracic paraspinal ttp Full ROM Neurological: She is alert and oriented to person, place, and time. No gross neuro deficit Full strength Sensation intact Skin: Skin is warm and dry. No rash noted. She is not diaphoretic. Psychiatric: She has a normal mood and affect. Nursing note and vitals reviewed. Diagnostic Study Results Labs - Labs Reviewed - No data to display Radiologic Studies -  
XR SHOULDER RT AP/LAT MIN 2 V    (Results Pending) Medical Decision Making I am the first provider for this patient. I reviewed the vital signs, available nursing notes, past medical history, past surgical history, family history and social history. Vital Signs-Reviewed the patient's vital signs. Pulse Oximetry Analysis -  99% on room air (Interpretation) wnl 
 
Cardiac Monitor: 
Rate: 60 
 
 
Records Reviewed: Nursing Notes (Time of Review: 6:25 AM) MDM, Progress Notes, Reevaluation, and Consults: 
 
 
ED Course as of Dec 29 0647 Sat Dec 29, 2018  
0631 Acute on chronic msk injury with focal muscular TTP on exam, no sxs worrisome for biliary disease. Tx symptomatically, discussed light exercise and stretching with tylenol and NSAID use, asks for 1 day work note. No features of sxs concerning for ACS. No acute abnormalities on XR and no acute injury reported. Patient has no new complaints, changes, or physical findings. Diagnostic studies were reviewed with the patient. Pt and/or family's questions and concerns were addressed. Care plan was outlined, including follow-up with PCP/specialist and return precautions were discussed. Patient is felt to be stable for discharge at this time. [KG] ED Course User Index [KG] Constantin Raines DO The patient was given: 
Medications  
acetaminophen (TYLENOL) tablet 1,000 mg (1,000 mg Oral Refused 12/29/18 0646)  
ibuprofen (MOTRIN) tablet 400 mg (400 mg Oral Given 12/29/18 0646) Diagnosis Clinical Impression: 1. Chronic right-sided thoracic back pain 2. Muscle strain Disposition: Discharge Follow-up Information Follow up With Specialties Details Why Contact [facility] EMERGENCY DEPT Emergency Medicine  If symptoms worsen [address] 
[phone] None  Schedule an appointment as soon as possible for a visit  None ([phone]) Patient stated that they have no PCP Medication List  
  
START taking these medications   
cyclobenzaprine 5 mg tablet Commonly known as:  FLEXERIL Take 2 Tabs by mouth three (3) times daily. ASK your doctor about these medications   
aspirin delayed-release 81 mg tablet 
  
cod liver oil Cap 
  
multivitamin tablet Commonly known as:  ONE A DAY Where to Get Your Medications Information about where to get these medications is not yet available Ask your nurse or doctor about these medications · cyclobenzaprine 5 mg tablet 
  
 
_______________________________ Scribe Attestation Allie Rhodes acting as a scribe for and in the presence of No att. providers found December 29, 2018 at 6:47 AM 
    
Provider Attestation:     
I personally performed the services described in the documentation, reviewed the documentation, as recorded by the scribe in my presence, and it accurately and completely records my words and actions. December 29, 2018 at 6:47 AM - No att. providers found

## 2018-12-29 NOTE — LETTER
NOTIFICATION RETURN TO WORK / SCHOOL 
 
12/29/2018 7:02 AM 
 
Ms. Efrem Omer [address] [id number] To Whom It May Concern: 
 
Efrem Omer is currently under the care of [facility] EMERGENCY DEPT. She will return to work/school on 12/29/2018 with light duty If there are questions or concerns please have the patient contact our office. Sincerely, Allie Mclean, DO

## 2020-01-30 ENCOUNTER — APPOINTMENT (OUTPATIENT)
Dept: GENERAL RADIOLOGY | Age: 55
End: 2020-01-30
Attending: PHYSICIAN ASSISTANT
Payer: MEDICAID

## 2020-01-30 ENCOUNTER — HOSPITAL ENCOUNTER (EMERGENCY)
Age: 55
Discharge: HOME | End: 2020-01-30
Attending: EMERGENCY MEDICINE | Admitting: EMERGENCY MEDICINE
Payer: MEDICAID

## 2020-01-30 VITALS
RESPIRATION RATE: 20 BRPM | HEART RATE: 84 BPM | DIASTOLIC BLOOD PRESSURE: 97 MMHG | OXYGEN SATURATION: 98 % | TEMPERATURE: 97.3 F | SYSTOLIC BLOOD PRESSURE: 160 MMHG

## 2020-01-30 DIAGNOSIS — M79.671 RIGHT FOOT PAIN: Primary | ICD-10-CM

## 2020-01-30 PROCEDURE — 99282 EMERGENCY DEPT VISIT SF MDM: CPT

## 2020-01-30 PROCEDURE — 73630 X-RAY EXAM OF FOOT: CPT

## 2020-01-30 RX ORDER — NAPROXEN 500 MG/1
500 TABLET ORAL 2 TIMES DAILY WITH MEALS
Qty: 20 TAB | Refills: 0 | Status: SHIPPED | OUTPATIENT
Start: 2020-01-30 | End: 2020-02-09

## 2020-01-30 NOTE — ED TRIAGE NOTES
Pt reports R foot pain ; \"I woke up with R foot pain. I can't bare weight on my foot'. I took IBU with no relief\".

## 2020-01-30 NOTE — DISCHARGE INSTRUCTIONS

## 2020-01-30 NOTE — ED PROVIDER NOTES
[facility] EMERGENCY DEPT      Patient Name: Humberto Vazquez    History of Presenting Illness     48 y.o. female otherwise healthy presents to the ED c/o right foot pain since waking this morning. She notes a constant indescribable pain to the right arch. 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 She notes taking Ibuprofen without much improvement. Denies numbness, weakness, known injury, or other symptoms. Patient denies any other associated signs or symptoms. Patient denies any other complaints. Nursing notes regarding the HPI and triage nursing notes were reviewed. Prior medical records were reviewed. Current Outpatient Medications   Medication Sig Dispense Refill    naproxen (NAPROSYN) 500 mg tablet Take 1 Tab by mouth two (2) times daily (with meals) for 10 days. 20 Tab 0    cyclobenzaprine (FLEXERIL) 5 mg tablet Take 2 Tabs by mouth three (3) times daily. 9 Tab 0    cod liver oil cap Take 1 Cap by mouth daily.  aspirin delayed-release 81 mg tablet Take 81 mg by mouth daily.  multivitamin (ONE A DAY) tablet Take 1 Tab by mouth daily. Past History     Past Medical History:  History reviewed. No pertinent past medical history. Past Surgical History:  Past Surgical History:   Procedure Laterality Date    HX  SECTION         Family History:  History reviewed. No pertinent family history. Social History:  Social History     Tobacco Use    Smoking status: Never Smoker    Smokeless tobacco: Never Used   Substance Use Topics    Alcohol use: No    Drug use: Never       Allergies:  No Known Allergies    Patient's primary care provider (as noted in EPIC):  None    Review of Systems   Constitutional:  Denies malaise, fever, chills. Extremity/MS: + right foot pain. Neuro:  Denies neurologic symptoms/deficits/paresthesias. Skin: Denies injury, rash, itching or skin changes. All other systems negative as reviewed.      Visit Vitals  BP (!) 160/97 (BP 1 Location: Right arm, BP Patient Position: At rest;Sitting)   Pulse 84   Temp 97.3 °F (36.3 °C)   Resp 20   SpO2 98%       PHYSICAL EXAM:    CONSTITUTIONAL:  Alert, in no apparent distress;  well developed;  well nourished. HEAD:  Normocephalic, atraumatic. EYES:  EOMI. Non-icteric sclera. Normal conjunctiva. ENTM:  Mouth: mucous membranes moist.  NECK:  Supple  RESPIRATORY:  Chest clear, equal breath sounds, good air movement. Without wheezes, rhonchi or rales. CARDIOVASCULAR:  Regular rate and rhythm. No murmurs, rubs, or gallops. GI:  Normal bowel sounds, abdomen soft and non-tender. No rebound or guarding. BACK:  Non-tender. UPPER EXT:  Normal inspection. LOWER EXT:  No edema, no calf tenderness. Distal pulses intact. NEURO:  Moves all four extremities, and grossly normal motor exam.  SKIN:  No rashes;  Normal for age. PSYCH:  Alert and normal affect. DIFFERENTIAL DIAGNOSES/ MEDICAL DECISION MAKING:  Contusion, sprain, dislocation, fracture, ligamentous tear/ disruption or a combination of the above. Xray right foot: NAD     IMPRESSION AND MEDICAL DECISION MAKING:  Based upon the patients presentation with noted HPI and PE, along with the work up done in the emergency department, I believe that the patient is having noted right foot pain. Patient likely has fallen arch versus plantar fasciitis versus other tendinopathy from doing lots of walking. Rx provided for Naprosyn, patient may rice, follow-up with podiatry. She declined any crutches here today. Diagnosis:   1.  Right foot pain      Disposition: Discharge    Follow-up Information     Follow up With Specialties Details Why Contact Info    Elisabet Ro DPM Podiatry In 3 days  [address] [address] [phone]      [facility] EMERGENCY DEPT Emergency Medicine  If symptoms worsen [address]  [phone]          Patient's Medications   Start Taking NAPROXEN (NAPROSYN) 500 MG TABLET    Take 1 Tab by mouth two (2) times daily (with meals) for 10 days. Continue Taking    ASPIRIN DELAYED-RELEASE 81 MG TABLET    Take 81 mg by mouth daily. COD LIVER OIL CAP    Take 1 Cap by mouth daily. CYCLOBENZAPRINE (FLEXERIL) 5 MG TABLET    Take 2 Tabs by mouth three (3) times daily. MULTIVITAMIN (ONE A DAY) TABLET    Take 1 Tab by mouth daily.    These Medications have changed    No medications on file   Stop Taking    No medications on file     AVTAR Funez